# Patient Record
Sex: MALE | Race: WHITE | NOT HISPANIC OR LATINO | URBAN - METROPOLITAN AREA
[De-identification: names, ages, dates, MRNs, and addresses within clinical notes are randomized per-mention and may not be internally consistent; named-entity substitution may affect disease eponyms.]

---

## 2024-06-05 ENCOUNTER — EMERGENCY (EMERGENCY)
Facility: HOSPITAL | Age: 39
LOS: 1 days | Discharge: HOME | End: 2024-06-05
Attending: EMERGENCY MEDICINE | Admitting: EMERGENCY MEDICINE
Payer: COMMERCIAL

## 2024-06-05 VITALS
DIASTOLIC BLOOD PRESSURE: 72 MMHG | HEART RATE: 88 BPM | HEIGHT: 71 IN | TEMPERATURE: 99 F | OXYGEN SATURATION: 96 % | WEIGHT: 190.04 LBS | SYSTOLIC BLOOD PRESSURE: 127 MMHG | RESPIRATION RATE: 16 BRPM
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
## 2024-06-05 DIAGNOSIS — M25.572 PAIN IN LEFT ANKLE AND JOINTS OF LEFT FOOT: ICD-10-CM

## 2024-06-05 DIAGNOSIS — S86.002A UNSPECIFIED INJURY OF LEFT ACHILLES TENDON, INITIAL ENCOUNTER: ICD-10-CM

## 2024-06-05 PROCEDURE — 73610 X-RAY EXAM OF ANKLE: CPT | Mod: 26,LT

## 2024-06-05 PROCEDURE — 99284 EMERGENCY DEPT VISIT MOD MDM: CPT

## 2024-06-05 RX ORDER — IBUPROFEN 200 MG
600 TABLET ORAL ONCE
Refills: 0 | Status: COMPLETED | OUTPATIENT
Start: 2024-06-05 | End: 2024-06-05

## 2024-06-05 RX ORDER — IBUPROFEN 200 MG
1 TABLET ORAL
Qty: 28 | Refills: 0
Start: 2024-06-05 | End: 2024-06-11

## 2024-06-05 RX ADMIN — Medication 600 MILLIGRAM(S): at 22:53

## 2024-06-05 NOTE — ED PROVIDER NOTE - NSFOLLOWUPINSTRUCTIONS_ED_ALL_ED_FT
Achilles Tendinitis    WHAT YOU NEED TO KNOW:    What is Achilles tendinitis? Achilles tendinitis is swelling of the tendon that connects your calf muscle to your heel bone. It may happen suddenly or become a chronic condition. Your risk for Achilles tendinitis increases as you age.    What causes Achilles tendinitis?    Overuse of your leg muscles    A sudden increase in the amount or intensity of an activity    Jumping or running on hard or uneven surfaces    Wearing shoes that do not fit or support your foot and ankle    Tight calf muscles    A bone spur where your Achilles tendon attaches to your heel    Medicines such as steroids or antibiotics  What are the signs and symptoms of Achilles tendinitis?    Pain in your heel that gets worse with activity    Swelling in your heel or calf    Stiffness in your heel or calf  How is Achilles tendinitis diagnosed? Your healthcare provider will examine your heel and calf for swelling. Your provider may move your foot or ankle and ask if you have pain. Tell your provider when your symptoms started and which activities make the pain worse. You may need an x-ray, ultrasound, or MRI to check for bone spurs or tears in your Achilles tendon. Do not enter the MRI room with anything metal. Metal can cause serious injury. Tell the healthcare provider if you have any metal in or on your body.    How is Achilles tendinitis treated?    Medicines may be given to decrease pain and swelling.    Support devices may include a splint, orthotic, or brace. These devices will decrease pressure on your Achilles tendon and help relieve pain.    Physical therapy may be needed. A physical therapist teaches you exercises to help improve movement and strength, and decrease pain. You may need to practice exercises at home.    Surgery and other procedures may be needed if other treatments do not work. Surgery may be done to repair a tear in the tendon, or to remove parts of the tendon. Other procedures may include an injection of platelets or red blood cells into your Achilles tendon. It may also include therapy with electrical currents to treat swelling and pain.  What can I do to manage Achilles tendinitis?    Rest as directed. Rest decreases swelling and prevents your tendinitis from getting worse. Your healthcare provider may tell you to stop your usual training or exercise activities. Ask when you can return to your normal activities or exercise plan.    Apply ice on your Achilles tendon for 15 to 20 minutes every hour or as directed. Use an ice pack, or put crushed ice in a plastic bag. Cover it with a towel. Ice helps prevent tissue damage and decreases swelling and pain.    Wear a compression bandage or use tape as directed. This will decrease swelling and pain. Ask your healthcare provider how to wrap a compression bandage or apply tape. If you use a support device ask if you should wear a compression bandage or use tape.    Elevate your heel above the level of your heart as often as you can. This will help decrease swelling and pain. Prop your heel on pillows or blankets to keep it elevated comfortably.    Stretch as directed when you return to your exercise program. Always warm up your muscles and stretch before you exercise. Do cool down exercises and stretches when you are finished. This will keep your muscles loose and decrease stress on your Achilles tendon.    Do bilateral heel drop exercises as directed. Bilateral heel drops strengthen your Achilles tendon. Do not do the following exercise unless your healthcare provider says it is safe:  Stand at the edge of a stair or raised step. Hold onto the railing for balance.    Place the front part of your foot on the stair or step. Let the back of your foot hang off of the stair or step.    Slowly lift your heels off the ground and then slowly lower your heels past the stair. Do not move your heels quickly. This could make your injury worse. Repeat this exercise 20 times or as directed.  Exercise for Achilles Tendon    Slowly increase the time and intensity when you return to your exercise program. Start with short and low intensity exercises. Ask your healthcare provider how and when to increase the time and intensity of your exercise.  When should I contact my healthcare provider?    You have a fever.    Your swelling or pain gets worse.    You feel or hear a sudden pop near your ankle.    You cannot bend your ankle or put pressure on your leg.    You have questions about your condition or care.

## 2024-06-05 NOTE — ED PROVIDER NOTE - CARE PROVIDER_API CALL
Braulio Chandra  Orthopaedic Surgery  [address] [place] [address], [address]  [place] [address]  Phone: [phone]  Fax: [phone]  Follow Up Time:

## 2024-06-05 NOTE — ED PROVIDER NOTE - PATIENT PORTAL LINK FT
You can access the FollowMyHealth Patient Portal offered by [facility] by registering at the following website: [email or web address]. By joining [employer]’s FollowMyHealth portal, you will also be able to view your health information using other applications (apps) compatible with our system.

## 2024-06-05 NOTE — ED PROVIDER NOTE - PHYSICAL EXAMINATION
VITAL SIGNS: I have reviewed nursing notes and confirm.  CONSTITUTIONAL: Well-developed; well-nourished; in no acute distress.  SKIN: Skin exam is warm and dry, no acute rash.  HEAD: Normocephalic; atraumatic.  EYES: PERRL, EOM intact; conjunctiva and sclera clear.  ENT: No nasal discharge; airway clear.  NECK: Supple; non tender.  CARD: RRR  RESP: Unlabored.    ABD: soft; non-distended; non-tender  EXT: + TTP posterior L ankle w/some edema, negative garces's test though pt feels weakness with plantar flexion, neurovasc intact  NEURO: Alert, oriented. Grossly unremarkable.  PSYCH: Cooperative, appropriate.

## 2024-06-05 NOTE — ED PROVIDER NOTE - OBJECTIVE STATEMENT
38 y/o M w/no sig pmhx p/w Left posterior ankle pain which began when patient was running while playing softball this evening.  No twisting injury or fall.  Patient suddenly felt a pop in the back of his ankle with localized pain to the area since that time.  Event occurred approximately 30 minutes prior to arrival.  Isolated injury.  Against patient is taking no medication for pain.  Patient has no allergies.

## 2024-06-05 NOTE — ED PROVIDER NOTE - CLINICAL SUMMARY MEDICAL DECISION MAKING FREE TEXT BOX
Negative x-ray.  No malleolar or tenderness or swelling.  Injuries most consistent with Achilles tendon partial tear.  Unable to obtain ultrasound in the emergency department overnight, however patient placed in boot and given crutches told to be nonambulatory until follow-up with orthopedics but patient will likely require an MRI to fully evaluate the extent of the injury to the Achilles tendon.  Continue with NSAIDs and Tylenol for pain control.  Given return precautions and anticipatory guidance.